# Patient Record
Sex: MALE | Race: BLACK OR AFRICAN AMERICAN | Employment: UNEMPLOYED | URBAN - METROPOLITAN AREA
[De-identification: names, ages, dates, MRNs, and addresses within clinical notes are randomized per-mention and may not be internally consistent; named-entity substitution may affect disease eponyms.]

---
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
## 2020-01-01 ENCOUNTER — HOSPITAL ENCOUNTER (EMERGENCY)
Age: 20
Discharge: HOME | End: 2020-01-01
Attending: EMERGENCY MEDICINE
Payer: MEDICAID

## 2020-01-01 VITALS
OXYGEN SATURATION: 97 % | WEIGHT: 302 LBS | HEIGHT: 68 IN | HEART RATE: 102 BPM | DIASTOLIC BLOOD PRESSURE: 107 MMHG | BODY MASS INDEX: 45.77 KG/M2 | SYSTOLIC BLOOD PRESSURE: 180 MMHG | RESPIRATION RATE: 20 BRPM | TEMPERATURE: 98.7 F

## 2020-01-01 DIAGNOSIS — R31.9 HEMATURIA, UNSPECIFIED TYPE: Primary | ICD-10-CM

## 2020-01-01 DIAGNOSIS — R73.9 HYPERGLYCEMIA: ICD-10-CM

## 2020-01-01 DIAGNOSIS — E11.9 TYPE 2 DIABETES MELLITUS WITHOUT COMPLICATION, WITHOUT LONG-TERM CURRENT USE OF INSULIN (HCC): ICD-10-CM

## 2020-01-01 DIAGNOSIS — I10 ESSENTIAL HYPERTENSION: ICD-10-CM

## 2020-01-01 LAB
APPEARANCE UR: ABNORMAL
BACTERIA URNS QL MICRO: NEGATIVE /HPF
BILIRUB UR QL: NEGATIVE
COLOR UR: YELLOW
EPITH CASTS URNS QL MICRO: ABNORMAL /LPF (ref 0–5)
GLUCOSE BLD STRIP.AUTO-MCNC: 281 MG/DL (ref 70–110)
GLUCOSE UR STRIP.AUTO-MCNC: >1000 MG/DL
HGB UR QL STRIP: ABNORMAL
KETONES UR QL STRIP.AUTO: 15 MG/DL
LEUKOCYTE ESTERASE UR QL STRIP.AUTO: NEGATIVE
MUCOUS THREADS URNS QL MICRO: ABNORMAL /LPF
NITRITE UR QL STRIP.AUTO: NEGATIVE
PH UR STRIP: 5 [PH] (ref 5–8)
PROT UR STRIP-MCNC: 100 MG/DL
RBC #/AREA URNS HPF: ABNORMAL /HPF (ref 0–5)
SP GR UR REFRACTOMETRY: >1.03 (ref 1–1.03)
UROBILINOGEN UR QL STRIP.AUTO: 1 EU/DL (ref 0.2–1)
WBC URNS QL MICRO: ABNORMAL /HPF (ref 0–4)

## 2020-01-01 PROCEDURE — 87491 CHLMYD TRACH DNA AMP PROBE: CPT

## 2020-01-01 PROCEDURE — 99283 EMERGENCY DEPT VISIT LOW MDM: CPT

## 2020-01-01 PROCEDURE — 81001 URINALYSIS AUTO W/SCOPE: CPT

## 2020-01-01 PROCEDURE — 87661 TRICHOMONAS VAGINALIS AMPLIF: CPT

## 2020-01-01 PROCEDURE — 87086 URINE CULTURE/COLONY COUNT: CPT

## 2020-01-01 PROCEDURE — 82962 GLUCOSE BLOOD TEST: CPT

## 2020-01-01 RX ORDER — PHENAZOPYRIDINE HYDROCHLORIDE 200 MG/1
200 TABLET, FILM COATED ORAL 3 TIMES DAILY
Qty: 6 TAB | Refills: 0 | Status: SHIPPED | OUTPATIENT
Start: 2020-01-01 | End: 2020-01-03

## 2020-01-01 RX ORDER — AMLODIPINE BESYLATE 5 MG/1
5 TABLET ORAL DAILY
Qty: 15 TAB | Refills: 0 | Status: SHIPPED | OUTPATIENT
Start: 2020-01-01 | End: 2021-10-12 | Stop reason: SDUPTHER

## 2020-01-01 RX ORDER — METFORMIN HYDROCHLORIDE 500 MG/1
500 TABLET ORAL 2 TIMES DAILY WITH MEALS
Qty: 30 TAB | Refills: 0 | Status: SHIPPED | OUTPATIENT
Start: 2020-01-01 | End: 2021-10-12 | Stop reason: SDUPTHER

## 2020-01-01 NOTE — ED PROVIDER NOTES
EMERGENCY DEPARTMENT HISTORY AND PHYSICAL EXAM    Date: 1/1/2020  Patient Name: Jovita Gutierres    History of Presenting Illness     Chief Complaint   Patient presents with    Blood in Urine         History Provided By:patient    Chief Complaint: hematuria   Duration: 2 days  Timing: acute  Location: n/a  Quality: burning  Severity: moderate  Modifying Factors: none  Associated Symptoms: dysuria       Additional History (Context): Jovita Gutierres is a 23 y.o. male with PMH DM, htn, morbid obesity, and asthma who presents with c/o hematuria and dysuria for the past 2 days. He has had kidney stones in the past but states his symptoms today are not similar to prior kidney stones. When asked about his history of diabetes and hypertension patient states he did not realize he was diabetic or had blood pressure issues. Patient is not currently taking any daily medications. No other complaints reported at this time. PCP: Kaitlin Colindres MD    Current Outpatient Medications   Medication Sig Dispense Refill    metFORMIN (GLUCOPHAGE) 500 mg tablet Take 1 Tab by mouth two (2) times daily (with meals). 30 Tab 0    amLODIPine (NORVASC) 5 mg tablet Take 1 Tab by mouth daily. 15 Tab 0    phenazopyridine (PYRIDIUM) 200 mg tablet Take 1 Tab by mouth three (3) times daily for 2 days. 6 Tab 0    mupirocin calcium (BACTROBAN) 2 % topical cream Apply  to affected area two (2) times a day.  15 g 0    clotrimazole (LOTRIMIN) 1 % topical cream Apply twice a day for 2-4 weeks 1 Tube 0       Past History     Past Medical History:  Past Medical History:   Diagnosis Date    Asthma     Diabetes ([address]     Hypertension     Morbid obesity ([facility] [address]        Past Surgical History:  Past Surgical History:   Procedure Laterality Date    HX ORTHOPAEDIC      left leg       Family History:  Family History   Problem Relation Age of Onset    Hypertension Other     Cancer Neg Hx     Diabetes Neg Hx     Stroke Neg Hx     Heart Disease Neg Hx Social History:  Social History     Tobacco Use    Smoking status: Never Smoker    Smokeless tobacco: Never Used   Substance Use Topics    Alcohol use: No    Drug use: No       Allergies:  No Known Allergies      Review of Systems   Review of Systems   Constitutional: Negative. Negative for chills and fever. HENT: Negative. Negative for congestion, ear pain and rhinorrhea. Eyes: Negative. Negative for pain and redness. Respiratory: Negative. Negative for cough, shortness of breath, wheezing and stridor. Cardiovascular: Negative. Negative for chest pain and leg swelling. Gastrointestinal: Negative. Negative for abdominal pain, constipation, diarrhea, nausea and vomiting. Genitourinary: Positive for dysuria and hematuria. Negative for frequency. Musculoskeletal: Negative. Negative for back pain and neck pain. Skin: Negative. Negative for rash and wound. Neurological: Negative. Negative for dizziness, seizures, syncope and headaches. All other systems reviewed and are negative. All Other Systems Negative  Physical Exam     Vitals:    01/01/20 1237 01/01/20 1318   BP: (!) 180/107    Pulse: 100 (!) 102   Resp: 20    Temp: (!) 102 °F (38.9 °C) 98.7 °F (37.1 °C)   SpO2: 97%    Weight: 137 kg (302 lb)    Height: 5' 8\" (1.727 m)      Physical Exam  Vitals signs and nursing note reviewed. Constitutional:       General: He is not in acute distress. Appearance: He is well-developed. He is obese. He is not diaphoretic. HENT:      Head: Normocephalic and atraumatic. Eyes:      General: No scleral icterus. Right eye: No discharge. Left eye: No discharge. Conjunctiva/sclera: Conjunctivae normal.   Neck:      Musculoskeletal: Normal range of motion and neck supple. Cardiovascular:      Rate and Rhythm: Normal rate and regular rhythm. Heart sounds: Normal heart sounds. No murmur. No friction rub. No gallop.     Pulmonary:      Effort: Pulmonary effort is normal. No respiratory distress. Breath sounds: Normal breath sounds. No stridor. No wheezing, rhonchi or rales. Abdominal:      General: Bowel sounds are normal. There is no distension. Palpations: Abdomen is soft. Tenderness: There is no tenderness. There is no right CVA tenderness, left CVA tenderness or guarding. Musculoskeletal: Normal range of motion. Skin:     General: Skin is warm and dry. Findings: No erythema or rash. Neurological:      Mental Status: He is alert and oriented to person, place, and time. Coordination: Coordination normal.      Comments: Gait is steady and patient exhibits no evidence of ataxia. Patient is able to ambulate without difficulty. No focal neurological deficit noted. No facial droop, slurred speech, or evidence of altered mentation noted on exam.     Psychiatric:         Behavior: Behavior normal.         Thought Content:  Thought content normal.                Diagnostic Study Results     Labs -     Recent Results (from the past 12 hour(s))   URINALYSIS W/ RFLX MICROSCOPIC    Collection Time: 01/01/20 12:45 PM   Result Value Ref Range    Color YELLOW      Appearance CLOUDY      Specific gravity >1.030 (H) 1.005 - 1.030    pH (UA) 5.0 5.0 - 8.0      Protein 100 (A) NEG mg/dL    Glucose >1,000 (A) NEG mg/dL    Ketone 15 (A) NEG mg/dL    Bilirubin NEGATIVE  NEG      Blood TRACE (A) NEG      Urobilinogen 1.0 0.2 - 1.0 EU/dL    Nitrites NEGATIVE  NEG      Leukocyte Esterase NEGATIVE  NEG     URINE MICROSCOPIC ONLY    Collection Time: 01/01/20 12:45 PM   Result Value Ref Range    WBC 4 to 10 0 - 4 /hpf    RBC 0 to 3 0 - 5 /hpf    Epithelial cells FEW 0 - 5 /lpf    Bacteria NEGATIVE  NEG /hpf    Mucus FEW (A) NEG /lpf   GLUCOSE, POC    Collection Time: 01/01/20  1:20 PM   Result Value Ref Range    Glucose (POC) 281 (H) 70 - 110 mg/dL       Radiologic Studies -   No orders to display     CT Results  (Last 48 hours)    None        CXR Results  (Last 48 hours)    None            Medical Decision Making   I am the first provider for this patient. I reviewed the vital signs, available nursing notes, past medical history, past surgical history, family history and social history. Vital Signs-Reviewed the patient's vital signs. Records Reviewed: Nicolette Machuca PA-C     Procedures:  Procedures    Provider Notes (Medical Decision Making): Impression:  Hematuria,htn, morbid obesity, dysuria, DM    UA shows trace hematuria, not convincing of UTI, sent for culture. Trichomonas/GC/chlamydia culture sent out. POC glucose 281. Patient has no flank pain or abdominal pain. He is afebrile. No indication for further work-up at this time. We will plan to discharge patient with Pyridium, and plan to start him on metformin/Norvasc with close PCP and urology follow-up recommended. Patient agrees with this plan. Nicolette Machuca PA-C     MED RECONCILIATION:  No current facility-administered medications for this encounter. Current Outpatient Medications   Medication Sig    metFORMIN (GLUCOPHAGE) 500 mg tablet Take 1 Tab by mouth two (2) times daily (with meals).  amLODIPine (NORVASC) 5 mg tablet Take 1 Tab by mouth daily.  phenazopyridine (PYRIDIUM) 200 mg tablet Take 1 Tab by mouth three (3) times daily for 2 days.  mupirocin calcium (BACTROBAN) 2 % topical cream Apply  to affected area two (2) times a day.  clotrimazole (LOTRIMIN) 1 % topical cream Apply twice a day for 2-4 weeks       Disposition:  D/c    DISCHARGE NOTE:   Patient is stable for discharge at this time. I have discussed all the findings from today's work up with the patient, including lab results and imaging. I have answered all questions. Rx for norvasc, pyridium, and metformin given. Rest and close follow-up with the PCP/urologist recommended this week. Return to the ED immediately for any new or worsening symptoms.   Nicolette Machuca PA-C     Follow-up Information     Follow up With Specialties Details Why Contact Info    [facility]  Schedule an appointment as soon as possible for a visit in 1 week  [address] [facility]    Ji Cabrera MD Pediatrics Schedule an appointment as soon as possible for a visit in 1 week  [address]  [address]  [phone] [address] [facility] EMERGENCY DEPT Emergency Medicine  As needed, If symptoms worsen [address] [facility]  [phone]          Current Discharge Medication List      START taking these medications    Details   metFORMIN (GLUCOPHAGE) 500 mg tablet Take 1 Tab by mouth two (2) times daily (with meals). Qty: 30 Tab, Refills: 0      amLODIPine (NORVASC) 5 mg tablet Take 1 Tab by mouth daily. Qty: 15 Tab, Refills: 0      phenazopyridine (PYRIDIUM) 200 mg tablet Take 1 Tab by mouth three (3) times daily for 2 days. Qty: 6 Tab, Refills: 0                 Diagnosis     Clinical Impression:   1. Hematuria, unspecified type    2. Essential hypertension    3. Type 2 diabetes mellitus without complication, without long-term current use of insulin ([address]    4.  Hyperglycemia

## 2020-01-01 NOTE — DISCHARGE INSTRUCTIONS
[employer] Activation    Thank you for requesting access to [employer]. Please follow the instructions below to securely access and download your online medical record. [employer] allows you to send messages to your doctor, view your test results, renew your prescriptions, schedule appointments, and more. How Do I Sign Up? 1. In your internet browser, go to [email or web address]  2. Click on the First Time User? Click Here link in the Sign In box. You will be redirect to the New Member Sign Up page. 3. Enter your [employer] Access Code exactly as it appears below. You will not need to use this code after youve completed the sign-up process. If you do not sign up before the expiration date, you must request a new code. [employer] Access Code: [email or web address]  Expires: 2020  4:52 PM (This is the date your [employer] access code will )    4. Enter the last four digits of your Social Security Number (xxxx) and Date of Birth (mm/dd/yyyy) as indicated and click Submit. You will be taken to the next sign-up page. 5. Create a [employer] ID. This will be your [employer] login ID and cannot be changed, so think of one that is secure and easy to remember. 6. Create a [employer] password. You can change your password at any time. 7. Enter your Password Reset Question and Answer. This can be used at a later time if you forget your password. 8. Enter your e-mail address. You will receive e-mail notification when new information is available in [address] 9. Click Sign Up. You can now view and download portions of your medical record. 10. Click the Download Summary menu link to download a portable copy of your medical information. Additional Information    If you have questions, please visit the Frequently Asked Questions section of the [employer] website at [email or web address] Remember, [employer] is NOT to be used for urgent needs. For medical emergencies, dial 911.

## 2020-01-01 NOTE — ED TRIAGE NOTES
Pt c/o blood in urine 2 days ago. Today c/o pain with urination. Has hx of kidney stones \"a long time ago\".  Does not appear to be in any distress

## 2020-01-03 LAB
BACTERIA SPEC CULT: NORMAL
SERVICE CMNT-IMP: NORMAL

## 2020-01-06 LAB
C TRACH RRNA SPEC QL NAA+PROBE: NORMAL
N GONORRHOEA RRNA SPEC QL NAA+PROBE: NORMAL
SPECIMEN SOURCE: NORMAL
T VAGINALIS RRNA VAG QL NAA+PROBE: NEGATIVE

## 2020-01-16 ENCOUNTER — OFFICE VISIT (OUTPATIENT)
Dept: SURGERY | Age: 20
End: 2020-01-16

## 2020-01-16 VITALS
DIASTOLIC BLOOD PRESSURE: 84 MMHG | RESPIRATION RATE: 18 BRPM | OXYGEN SATURATION: 97 % | HEART RATE: 94 BPM | BODY MASS INDEX: 47.74 KG/M2 | HEIGHT: 68 IN | TEMPERATURE: 98.4 F | WEIGHT: 315 LBS | SYSTOLIC BLOOD PRESSURE: 146 MMHG

## 2020-01-16 DIAGNOSIS — E66.01 MORBID OBESITY (HCC): Primary | ICD-10-CM

## 2020-01-16 NOTE — PROGRESS NOTES
Chief Complaint   Patient presents with    Morbid Obesity     consult     Pt ID confirmed    Weight Loss Metrics 1/16/2020 1/16/2020 1/1/2020 12/7/2019 9/5/2019 2/14/2019 12/1/2018   Pre op / Initial Wt 395.3 - - - - - -   Today's Wt - 395 lb 4.8 oz 302 lb 318 lb 320 lb 320 lb 350 lb   BMI - 60.11 kg/m2 45.92 kg/m2 51.33 kg/m2 48.66 kg/m2 47.26 kg/m2 -   Ideal Body Wt 151 - - - - - -   Excess Body Wt 244.3 - - - - - -   Wt loss to date 0 - - - - - -   % Wt Loss 0 - - - - - -   80% .44 - - - - - -       Body mass index is 60.11 kg/m².

## 2020-01-16 NOTE — LETTER
1/16/20 Patient: Flor Dowell YOB: 2000 Date of Visit: 1/16/2020 Ruben Brannon MD 
[address] [facility] [address] [facility] [address] VIA Facsimile: [phone] Dear Ruben Brannon MD, Thank you for referring Mr. Flor Dowell to [place] [address] for evaluation. My notes for this consultation are attached. If you have questions, please do not hesitate to call me. I look forward to following your patient along with you.  
 
 
Sincerely, 
 
Lucía Gastelum MD

## 2020-01-16 NOTE — PROGRESS NOTES
Initial Consultation for Bariatric Surgery Template     Flor Dowell is a 23 y.o. male who comes into the office today for initial consultation for the surgical options for the treatment of morbid obesity. The patient initially identified obesity at the age of 11 and at age 25 weighed 370 lbs. He has gained 25 lbs in the last year. He cannot explain why he wants weight loss surgery, any of his eating behaviors or why he thinks WLS would benefit him. .    Past Medical History:   Diagnosis Date    Asthma     [facility] [address]     Hypertension     Morbid obesity ([facility] [address]        Past Surgical History:   Procedure Laterality Date    HX ORTHOPAEDIC      left leg    HX TONSILLECTOMY      HX TYMPANOSTOMY      when child       Current Outpatient Medications on File Prior to Visit   Medication Sig Dispense Refill    metFORMIN (GLUCOPHAGE) 500 mg tablet Take 1 Tab by mouth two (2) times daily (with meals). 30 Tab 0    amLODIPine (NORVASC) 5 mg tablet Take 1 Tab by mouth daily. 15 Tab 0    clotrimazole (LOTRIMIN) 1 % topical cream Apply twice a day for 2-4 weeks 1 Tube 0     No current facility-administered medications on file prior to visit.         No Known Allergies    Social History     Tobacco Use    Smoking status: Never Smoker    Smokeless tobacco: Never Used   Substance Use Topics    Alcohol use: No    Drug use: No       Family History   Problem Relation Age of Onset    Hypertension Other     No Known Problems Mother     No Known Problems Father     Cancer Neg Hx     Diabetes Neg Hx     Stroke Neg Hx     Heart Disease Neg Hx        Family Status   Relation Name Status    Other  (Not Specified)    Mother  Alive   Amanda Arzate Father  Alive    Neg Hx  (Not Specified)       Review of Systems:  Positive in BOLD    CONST: Fever, weight loss, fatigue or chills  GI: Nausea, vomiting, abdominal pain, change in bowel habits, hematochezia, melena, and GERD   INTEG: Dermatitis, abnormal moles  HEENT: Recent changes in vision, vertigo, epistaxis, dysphagia and hoarseness  CV: Chest pain, palpitations, HTN, edema and varicosities  RESP: Cough, shortness of breath, wheezing, hemoptysis, snoring and reactive airway disease  : Hematuria, dysuria, frequency, urgency, nocturia and stress urinary incontinence   MS: Weakness, joint pain and arthritis  ENDO: Diabetes, thyroid disease, polyuria, polydipsia, polyphagia, poor wound healing, heat intolerance, cold intolerance  LYMPH/HEME: Anemia, bruising and history of blood transfusions  NEURO: Dizziness, headache, fainting, seizures and stroke  PSYCH: Anxiety and depression      Physical Exam    Visit Vitals  /84   Pulse 94   Temp 98.4 °F (36.9 °C) (Oral)   Resp 18   Ht 5' 8\" (1.727 m)   Wt (!) 179.3 kg (395 lb 4.8 oz)   SpO2 97%   BMI 60.11 kg/m²       Pre op weight: 395.3  EBW: 244. 3  Wt loss to date: 0       General: 23 y.o.) male in no acute distress. Morbidly obese in hips and abdomen  HEENT: Normocephalic, atraumatic, Pupils equal and reactive, nasopharynx clear, oropharynx clear and moist without lesions  NECK: Supple, no lymphadenopathy, thyromegaly, carotid bruits or jugular venous distension. trachea midline  Psych: Alert and oriented to person, place, and time. Extremely evasive, did not answer a single question directly including simple yes and no questions. Impression:    Marcelino Mason is a 23 y.o. male who is suffering from morbid obesity with a BMI of 60  and comorbidities including adult onset diabetes mellitus, hypertension and reactive airway disease  who would benefit from bariatric surgery. Unfortunately, he exhibits very little insight into his illness and his behaviors and is unwilling to cooperate with a WLS evaluation. I have told him that I do not think he is ready to have WLS and I recommended he take some time, evaluate his priorities and retrun when he is willing to commit to [facility] and its need for change.

## 2021-10-12 ENCOUNTER — HOSPITAL ENCOUNTER (EMERGENCY)
Age: 21
Discharge: HOME | End: 2021-10-12
Attending: STUDENT IN AN ORGANIZED HEALTH CARE EDUCATION/TRAINING PROGRAM
Payer: MEDICAID

## 2021-10-12 ENCOUNTER — APPOINTMENT (OUTPATIENT)
Dept: CT IMAGING | Age: 21
End: 2021-10-12
Attending: NURSE PRACTITIONER
Payer: MEDICAID

## 2021-10-12 VITALS
HEIGHT: 67 IN | HEART RATE: 89 BPM | SYSTOLIC BLOOD PRESSURE: 169 MMHG | OXYGEN SATURATION: 98 % | TEMPERATURE: 98.6 F | RESPIRATION RATE: 17 BRPM | BODY MASS INDEX: 48.65 KG/M2 | DIASTOLIC BLOOD PRESSURE: 108 MMHG | WEIGHT: 310 LBS

## 2021-10-12 DIAGNOSIS — Z11.3 SCREEN FOR STD (SEXUALLY TRANSMITTED DISEASE): ICD-10-CM

## 2021-10-12 DIAGNOSIS — N34.2 URETHRITIS: Primary | ICD-10-CM

## 2021-10-12 LAB
APPEARANCE UR: CLEAR
BACTERIA URNS QL MICRO: ABNORMAL /HPF
BASOPHILS # BLD: 0 K/UL (ref 0–0.1)
BASOPHILS NFR BLD: 1 % (ref 0–2)
BILIRUB UR QL: NEGATIVE
COLOR UR: YELLOW
DIFFERENTIAL METHOD BLD: ABNORMAL
EOSINOPHIL # BLD: 0.4 K/UL (ref 0–0.4)
EOSINOPHIL NFR BLD: 4 % (ref 0–5)
EPITH CASTS URNS QL MICRO: ABNORMAL /LPF (ref 0–5)
ERYTHROCYTE [DISTWIDTH] IN BLOOD BY AUTOMATED COUNT: 12.7 % (ref 11.6–14.5)
GLUCOSE UR STRIP.AUTO-MCNC: >1000 MG/DL
HCT VFR BLD AUTO: 44.5 % (ref 36–48)
HGB BLD-MCNC: 14.8 G/DL (ref 13–16)
HGB UR QL STRIP: ABNORMAL
KETONES UR QL STRIP.AUTO: 40 MG/DL
LEUKOCYTE ESTERASE UR QL STRIP.AUTO: ABNORMAL
LYMPHOCYTES # BLD: 2.6 K/UL (ref 0.9–3.6)
LYMPHOCYTES NFR BLD: 29 % (ref 21–52)
MCH RBC QN AUTO: 27.3 PG (ref 24–34)
MCHC RBC AUTO-ENTMCNC: 33.3 G/DL (ref 31–37)
MCV RBC AUTO: 82 FL (ref 78–100)
MONOCYTES # BLD: 0.6 K/UL (ref 0.05–1.2)
MONOCYTES NFR BLD: 7 % (ref 3–10)
NEUTS SEG # BLD: 5.3 K/UL (ref 1.8–8)
NEUTS SEG NFR BLD: 59 % (ref 40–73)
NITRITE UR QL STRIP.AUTO: NEGATIVE
PH UR STRIP: 6 [PH] (ref 5–8)
PLATELET # BLD AUTO: 440 K/UL (ref 135–420)
PMV BLD AUTO: 9.2 FL (ref 9.2–11.8)
PROT UR STRIP-MCNC: 100 MG/DL
RBC # BLD AUTO: 5.43 M/UL (ref 4.35–5.65)
RBC #/AREA URNS HPF: ABNORMAL /HPF (ref 0–5)
SP GR UR REFRACTOMETRY: >1.03 (ref 1–1.03)
UROBILINOGEN UR QL STRIP.AUTO: 1 EU/DL (ref 0.2–1)
WBC # BLD AUTO: 8.9 K/UL (ref 4.6–13.2)
WBC URNS QL MICRO: ABNORMAL /HPF (ref 0–5)

## 2021-10-12 PROCEDURE — 87147 CULTURE TYPE IMMUNOLOGIC: CPT

## 2021-10-12 PROCEDURE — 85025 COMPLETE CBC W/AUTO DIFF WBC: CPT

## 2021-10-12 PROCEDURE — 81001 URINALYSIS AUTO W/SCOPE: CPT

## 2021-10-12 PROCEDURE — 99283 EMERGENCY DEPT VISIT LOW MDM: CPT

## 2021-10-12 PROCEDURE — 74011000250 HC RX REV CODE- 250: Performed by: NURSE PRACTITIONER

## 2021-10-12 PROCEDURE — 74011250636 HC RX REV CODE- 250/636: Performed by: NURSE PRACTITIONER

## 2021-10-12 PROCEDURE — 96372 THER/PROPH/DIAG INJ SC/IM: CPT

## 2021-10-12 PROCEDURE — 87086 URINE CULTURE/COLONY COUNT: CPT

## 2021-10-12 PROCEDURE — 87491 CHLMYD TRACH DNA AMP PROBE: CPT

## 2021-10-12 PROCEDURE — 74011250637 HC RX REV CODE- 250/637: Performed by: NURSE PRACTITIONER

## 2021-10-12 PROCEDURE — 87661 TRICHOMONAS VAGINALIS AMPLIF: CPT

## 2021-10-12 PROCEDURE — 74176 CT ABD & PELVIS W/O CONTRAST: CPT

## 2021-10-12 RX ORDER — CEPHALEXIN 500 MG/1
500 CAPSULE ORAL 2 TIMES DAILY
Qty: 20 CAPSULE | Refills: 0 | Status: SHIPPED | OUTPATIENT
Start: 2021-10-12 | End: 2021-10-22

## 2021-10-12 RX ORDER — METRONIDAZOLE 500 MG/1
2000 TABLET ORAL
Status: COMPLETED | OUTPATIENT
Start: 2021-10-12 | End: 2021-10-12

## 2021-10-12 RX ORDER — METFORMIN HYDROCHLORIDE 500 MG/1
500 TABLET ORAL 2 TIMES DAILY WITH MEALS
Qty: 60 TABLET | Refills: 0 | Status: SHIPPED | OUTPATIENT
Start: 2021-10-12 | End: 2021-11-11

## 2021-10-12 RX ORDER — AZITHROMYCIN 500 MG/1
1000 TABLET, FILM COATED ORAL
Qty: 2 TABLET | Refills: 0 | Status: SHIPPED | OUTPATIENT
Start: 2021-10-12 | End: 2021-10-12

## 2021-10-12 RX ORDER — AMLODIPINE BESYLATE 5 MG/1
5 TABLET ORAL DAILY
Qty: 30 TABLET | Refills: 0 | Status: SHIPPED | OUTPATIENT
Start: 2021-10-12 | End: 2021-11-11

## 2021-10-12 RX ADMIN — METRONIDAZOLE 2000 MG: 500 TABLET ORAL at 12:27

## 2021-10-12 RX ADMIN — LIDOCAINE HYDROCHLORIDE 1 G: 10 INJECTION, SOLUTION EPIDURAL; INFILTRATION; INTRACAUDAL; PERINEURAL at 12:28

## 2021-10-12 NOTE — ED PROVIDER NOTES
EMERGENCY DEPARTMENT HISTORY AND PHYSICAL EXAM    12:14 PM      Date: 10/12/2021  Patient Name: Zak Moreno    History of Presenting Illness     Chief Complaint   Patient presents with    Other     pain on urination         History Provided By: Patient    Additional History (Context): Zak Moreno is a 24 y.o. male with past medical history significant for morbid obesity, hypertension, diabetes, and asthma who presents with complaints of dysuria that started yesterday and concern for possible kidney stone as he has had this in the past.  Patient denies fever, chills, nausea, vomiting, diarrhea, constipation, abdominal pain, flank pain, or back pain. He denies any exposure to STD and denies any penile discharge, lesions or rash, testicular pain or swelling. He is open to being tested and treated for STDs. Does report that he has been out of his hypertension and diabetes medications for about a month. PCP: Hines Osler, MD    Current Outpatient Medications   Medication Sig Dispense Refill    azithromycin (ZITHROMAX) 500 mg tab Take 2 Tablets by mouth now for 1 dose. 2 Tablet 0    cephALEXin (Keflex) 500 mg capsule Take 1 Capsule by mouth two (2) times a day for 10 days. 20 Capsule 0    amLODIPine (Norvasc) 5 mg tablet Take 1 Tablet by mouth daily for 30 days. 30 Tablet 0    metFORMIN (GLUCOPHAGE) 500 mg tablet Take 1 Tablet by mouth two (2) times daily (with meals) for 30 days.  60 Tablet 0    clotrimazole (LOTRIMIN) 1 % topical cream Apply twice a day for 2-4 weeks 1 Tube 0       Past History     Past Medical History:  Past Medical History:   Diagnosis Date    Asthma     Diabetes ([address]     Hypertension     Morbid obesity ([address]        Past Surgical History:  Past Surgical History:   Procedure Laterality Date    HX ORTHOPAEDIC      left leg    HX TONSILLECTOMY      HX TYMPANOSTOMY      when child       Family History:  Family History   Problem Relation Age of Onset    Hypertension Other     No Known Problems Mother     No Known Problems Father     Cancer Neg Hx     Diabetes Neg Hx     Stroke Neg Hx     Heart Disease Neg Hx        Social History:  Social History     Tobacco Use    Smoking status: Never Smoker    Smokeless tobacco: Never Used   Substance Use Topics    Alcohol use: No    Drug use: No       Allergies:  No Known Allergies      Review of Systems       Review of Systems   Constitutional: Negative. Negative for chills and fever. HENT: Negative. Negative for congestion, ear pain and rhinorrhea. Eyes: Negative. Negative for pain and redness. Respiratory: Negative. Negative for cough and shortness of breath. Cardiovascular: Negative. Negative for chest pain, palpitations and leg swelling. Gastrointestinal: Positive for abdominal pain (Suprapubic). Negative for constipation, diarrhea, nausea and vomiting. Genitourinary: Positive for dysuria. Negative for frequency, hematuria and urgency. Musculoskeletal: Negative. Negative for back pain, gait problem, joint swelling and neck pain. Skin: Negative. Negative for rash and wound. Neurological: Negative. Negative for dizziness, seizures, speech difficulty, weakness, light-headedness and headaches. Hematological: Negative for adenopathy. Does not bruise/bleed easily. All other systems reviewed and are negative. Physical Exam     Visit Vitals  BP (!) 169/108   Pulse 89   Temp 98.6 °F (37 °C)   Resp 17   Ht 5' 7\" (1.702 m)   Wt 140.6 kg (310 lb)   SpO2 98%   BMI 48.55 kg/m²         Physical Exam  Vitals and nursing note reviewed. Constitutional:       General: He is not in acute distress. Appearance: Normal appearance. He is obese. He is not ill-appearing, toxic-appearing or diaphoretic. HENT:      Head: Normocephalic and atraumatic. Nose: Nose normal. No congestion or rhinorrhea. Mouth/Throat:      Mouth: Mucous membranes are moist.      Pharynx: Oropharynx is clear.  No oropharyngeal exudate or posterior oropharyngeal erythema. Eyes:      General: Vision grossly intact. Gaze aligned appropriately. No scleral icterus. Right eye: No discharge. Left eye: No discharge. Conjunctiva/sclera: Conjunctivae normal.   Cardiovascular:      Rate and Rhythm: Normal rate and regular rhythm. Pulses: Normal pulses. Heart sounds: Normal heart sounds. No murmur heard. No gallop. Pulmonary:      Effort: Pulmonary effort is normal. No respiratory distress. Breath sounds: Normal breath sounds. No stridor. No wheezing, rhonchi or rales. Chest:      Chest wall: No tenderness. Abdominal:      General: Abdomen is flat. Palpations: Abdomen is soft. Tenderness: There is no abdominal tenderness. There is no right CVA tenderness, left CVA tenderness, guarding or rebound. Musculoskeletal:         General: Normal range of motion. Cervical back: Full passive range of motion without pain, normal range of motion and neck supple. No rigidity or tenderness. Lymphadenopathy:      Cervical: No cervical adenopathy. Skin:     General: Skin is warm and dry. Capillary Refill: Capillary refill takes less than 2 seconds. Findings: No rash. Neurological:      General: No focal deficit present. Mental Status: He is alert and oriented to person, place, and time.    Psychiatric:         Mood and Affect: Mood normal.           Diagnostic Study Results     Labs -  Recent Results (from the past 12 hour(s))   URINALYSIS W/ RFLX MICROSCOPIC    Collection Time: 10/12/21 11:29 AM   Result Value Ref Range    Color YELLOW      Appearance CLEAR      Specific gravity >1.030 (H) 1.005 - 1.030    pH (UA) 6.0 5.0 - 8.0      Protein 100 (A) NEG mg/dL    Glucose >1,000 (A) NEG mg/dL    Ketone 40 (A) NEG mg/dL    Bilirubin Negative NEG      Blood SMALL (A) NEG      Urobilinogen 1.0 0.2 - 1.0 EU/dL    Nitrites Negative NEG      Leukocyte Esterase TRACE (A) NEG     URINE MICROSCOPIC ONLY Collection Time: 10/12/21 11:29 AM   Result Value Ref Range    WBC 5 to 10 0 - 5 /hpf    RBC 1 to 4 0 - 5 /hpf    Epithelial cells 1+ 0 - 5 /lpf    Bacteria 1+ (A) NEG /hpf   CBC WITH AUTOMATED DIFF    Collection Time: 10/12/21 12:37 PM   Result Value Ref Range    WBC 8.9 4.6 - 13.2 K/uL    RBC 5.43 4.35 - 5.65 M/uL    HGB 14.8 13.0 - 16.0 g/dL    HCT 44.5 36.0 - 48.0 %    MCV 82.0 78.0 - 100.0 FL    MCH 27.3 24.0 - 34.0 PG    MCHC 33.3 31.0 - 37.0 g/dL    RDW 12.7 11.6 - 14.5 %    PLATELET 220 (H) 649 - 420 K/uL    MPV 9.2 9.2 - 11.8 FL    NEUTROPHILS 59 40 - 73 %    LYMPHOCYTES 29 21 - 52 %    MONOCYTES 7 3 - 10 %    EOSINOPHILS 4 0 - 5 %    BASOPHILS 1 0 - 2 %    ABS. NEUTROPHILS 5.3 1.8 - 8.0 K/UL    ABS. LYMPHOCYTES 2.6 0.9 - 3.6 K/UL    ABS. MONOCYTES 0.6 0.05 - 1.2 K/UL    ABS. EOSINOPHILS 0.4 0.0 - 0.4 K/UL    ABS. BASOPHILS 0.0 0.0 - 0.1 K/UL    DF AUTOMATED         Radiologic Studies -   CT ABD PELV WO CONT   Final Result      No nephrolithiasis or hydronephrosis. Borderline hepatic steatosis. Medical Decision Making   I am the first provider for this patient. I reviewed available nursing notes, past medical history, past surgical history, family history and social history. Vital Signs-Reviewed the patient's vital signs. Records Reviewed: Nursing Notes and Old Medical Records (Time of Review: 12:14 PM)    Pulse Oximetry Analysis - 98% on RA- normal     ED Course: Progress Notes, Reevaluation, and Consults:  12:14 PM  Initial assessment performed. The patients presenting problems have been discussed, and they/their family are in agreement with the care plan formulated and outlined with them. I have encouraged them to ask questions as they arise throughout their visit. 2:13 PM patient reassessed and results discussed. CBC shows no leukocytosis, anemia, shift, or bands. Urinalysis does show significant blood, RBCs, bacteria, and leukocyte esterase.   He also has protein, glucose, and ketones which is likely due to his uncontrolled diabetes and high blood pressure. BMP did not result as blood was hemolyzed and patient does not want to wait for repeat labs. Discussed not knowing his kidney function or glucose and he verbalized understanding will follow up with PCP. CT of the abdomen and pelvis shows no nephrolithiasis or hydronephrosis. Borderline hepatic steatosis. I discussed the available findings with patient and need for antibiotics. He has been treated empirically for STDs we will also refill his Norvasc and Metformin as well as cephalexin for UTI. Needs close follow-up with PCP. ER return precautions discussed. Provider Notes (Medical Decision Making):     Patient is a 68-year-old male who presents to the ER with dysuria and history of kidney stone. He denies any hematuria, fever, vomiting, abdominal or flank pain. He is not concerned for STDs but would like to be tested and treated. Offered testicular and genital exam but patient has declined need for this. He has no abdominal pain, flank pain, or back pain. Aside from elevated blood pressure, vitals are stable and he is afebrile. Will obtain appropriate studies to evaluate patient's complaints and treat symptomatically. Will disposition after reassessment assuming no clinical change or worsening and appropriate response to symptomatic treatment. Diagnosis     Clinical Impression:   1. Urethritis    2. Screen for STD (sexually transmitted disease)        Disposition: Home in stable condition    DISCHARGE NOTE:     Patient has been reexamined. Patient has no new complaints, changes, or physical findings. Care plan outlined and precautions discussed. Results of labs and ultrasound were reviewed with the patient. All medications were reviewed with the patient; will discharge home with refill of Norvasc and Metformin, and azithromycin for chlamydia empirically, cephalexin for UTI.  All of patient's questions and concerns were addressed. Patient was instructed and agrees to follow up with PCP, as well as to return to the ED upon further deterioration. Patient is ready to go home. Follow-up Information     Follow up With Specialties Details Why Contact Sudeep Castillo MD Pediatric Medicine Schedule an appointment as soon as possible for a visit  Follow-up from the Emergency Department [address] [facility]  [address] [facility] [address] [address]  [phone]      [facility] EMERGENCY DEPT Emergency Medicine  As needed, If symptoms worsen [address] [facility] [address]  [phone]           Discharge Medication List as of 10/12/2021  2:06 PM      START taking these medications    Details   azithromycin (ZITHROMAX) 500 mg tab Take 2 Tablets by mouth now for 1 dose., Normal, Disp-2 Tablet, R-0      cephALEXin (Keflex) 500 mg capsule Take 1 Capsule by mouth two (2) times a day for 10 days. , Normal, Disp-20 Capsule, R-0         CONTINUE these medications which have NOT CHANGED    Details   metFORMIN (GLUCOPHAGE) 500 mg tablet Take 1 Tab by mouth two (2) times daily (with meals). , Print, Disp-30 Tab, R-0      amLODIPine (NORVASC) 5 mg tablet Take 1 Tab by mouth daily. , Print, Disp-15 Tab, R-0      clotrimazole (LOTRIMIN) 1 % topical cream Apply twice a day for 2-4 weeks, Print, Disp-1 Tube, R-0               Dictation disclaimer:  Please note that this dictation was completed with [employer], the computer voice recognition software. Quite often unanticipated grammatical, syntax, homophones, and other interpretive errors are inadvertently transcribed by the computer software. Please disregard these errors. Please excuse any errors that have escaped final proofreading.

## 2021-10-12 NOTE — DISCHARGE INSTRUCTIONS
You were treated for exposure to a possible STD. No sexual activity for the next 2 weeks. Any sexual partner(s) should be checked for STD's as well. You should follow-up with the [facility] for any further testing for STDs, including HIV.     Address: [address]  Telephone: [phone]

## 2021-10-13 LAB
BACTERIA SPEC CULT: ABNORMAL
C TRACH RRNA SPEC QL NAA+PROBE: NEGATIVE
CC UR VC: ABNORMAL
N GONORRHOEA RRNA SPEC QL NAA+PROBE: NEGATIVE
SERVICE CMNT-IMP: ABNORMAL
SPECIMEN SOURCE: NORMAL

## 2021-10-15 LAB
SPECIMEN SOURCE: NORMAL
T VAGINALIS RRNA VAG QL NAA+PROBE: NEGATIVE

## 2022-03-19 PROBLEM — E66.01 MORBID OBESITY (HCC): Status: ACTIVE | Noted: 2020-01-16

## 2023-07-04 ENCOUNTER — HOSPITAL ENCOUNTER (EMERGENCY)
Facility: HOSPITAL | Age: 23
Discharge: HOME | End: 2023-07-04
Attending: EMERGENCY MEDICINE
Payer: MEDICAID

## 2023-07-04 VITALS
SYSTOLIC BLOOD PRESSURE: 139 MMHG | BODY MASS INDEX: 44.73 KG/M2 | OXYGEN SATURATION: 99 % | DIASTOLIC BLOOD PRESSURE: 87 MMHG | HEART RATE: 117 BPM | RESPIRATION RATE: 16 BRPM | TEMPERATURE: 98.4 F | WEIGHT: 285 LBS | HEIGHT: 67 IN

## 2023-07-04 DIAGNOSIS — L02.419 CELLULITIS AND ABSCESS OF LEG: Primary | ICD-10-CM

## 2023-07-04 DIAGNOSIS — L03.119 CELLULITIS AND ABSCESS OF LEG: Primary | ICD-10-CM

## 2023-07-04 PROCEDURE — 2500000003 HC RX 250 WO HCPCS: Performed by: EMERGENCY MEDICINE

## 2023-07-04 PROCEDURE — 99283 EMERGENCY DEPT VISIT LOW MDM: CPT

## 2023-07-04 PROCEDURE — 6370000000 HC RX 637 (ALT 250 FOR IP): Performed by: EMERGENCY MEDICINE

## 2023-07-04 RX ORDER — LIDOCAINE HYDROCHLORIDE AND EPINEPHRINE 10; 10 MG/ML; UG/ML
10 INJECTION, SOLUTION INFILTRATION; PERINEURAL
Status: COMPLETED | OUTPATIENT
Start: 2023-07-04 | End: 2023-07-04

## 2023-07-04 RX ORDER — SULFAMETHOXAZOLE AND TRIMETHOPRIM 800; 160 MG/1; MG/1
1 TABLET ORAL 2 TIMES DAILY
Qty: 14 TABLET | Refills: 0 | Status: SHIPPED | OUTPATIENT
Start: 2023-07-04 | End: 2023-07-11

## 2023-07-04 RX ORDER — ACETAMINOPHEN 500 MG
1000 TABLET ORAL
Status: COMPLETED | OUTPATIENT
Start: 2023-07-04 | End: 2023-07-04

## 2023-07-04 RX ORDER — CEPHALEXIN 500 MG/1
500 CAPSULE ORAL 4 TIMES DAILY
Qty: 28 CAPSULE | Refills: 0 | Status: SHIPPED | OUTPATIENT
Start: 2023-07-04 | End: 2023-07-11

## 2023-07-04 RX ADMIN — ACETAMINOPHEN 1000 MG: 500 TABLET ORAL at 06:46

## 2023-07-04 RX ADMIN — LIDOCAINE HYDROCHLORIDE AND EPINEPHRINE 10 ML: 10; 10 INJECTION, SOLUTION INFILTRATION; PERINEURAL at 06:50

## 2023-07-04 ASSESSMENT — PAIN SCALES - GENERAL
PAINLEVEL_OUTOF10: 8

## 2023-07-04 ASSESSMENT — PAIN DESCRIPTION - LOCATION: LOCATION: BUTTOCKS

## 2023-07-04 ASSESSMENT — PAIN DESCRIPTION - DESCRIPTORS: DESCRIPTORS: ACHING

## 2023-07-04 NOTE — DISCHARGE INSTRUCTIONS
Apply warm compress to the area to promote further drainage of pus clean daily with soap and water followed by drying. Can cover to prevent staining of close.

## 2023-07-04 NOTE — ED TRIAGE NOTES
Pt aox4, ambulatory. Comes in the ED for an abscess under the left butt cheek. Pt reports noticing it yesterday. Pt stated it is draining. Previous abscess in a different site.

## 2023-07-04 NOTE — ED PROVIDER NOTES
EMERGENCY DEPARTMENT HISTORY AND PHYSICAL EXAM      Date: 7/4/2023  Patient Name: Luz Trejo      History of Presenting Illness     Chief Complaint   Patient presents with    Abscess     Under the left butt cheek       Location/Duration/Severity/Modifying factors   Chief Complaint   Patient presents with    Abscess     Under the left butt cheek       HPI:  Luz Trejo is a 21 y.o. male with PMH significant for diabetes mellitus, obesity, prior skin abscesses presents with painful mass below left cheek. Had similar mass in the past but not to this area. Denies any recent trauma or insect bite to the area that he can recall. Denies associated fevers or chills, nausea or vomiting, bodyaches or fatigue. Treatments attempted at home include ibuprofen this morning.     PCP: Macy Rowland MD    Current Facility-Administered Medications   Medication Dose Route Frequency Provider Last Rate Last Admin    lidocaine-EPINEPHrine 1 %-1:[id number] injection 10 mL  10 mL IntraDERmal NOW Jenny Rash, DO         Current Outpatient Medications   Medication Sig Dispense Refill    cephALEXin (KEFLEX) 500 MG capsule Take 1 capsule by mouth 4 times daily for 7 days 28 capsule 0    sulfamethoxazole-trimethoprim (BACTRIM DS) 800-160 MG per tablet Take 1 tablet by mouth 2 times daily for 7 days 14 tablet 0    clotrimazole (LOTRIMIN) 1 % cream Apply twice a day for 2-4 weeks         Past History     Past Medical History:  Past Medical History:   Diagnosis Date    Asthma     Diabetes ([address])     Hypertension     Morbid obesity ([address])        Past Surgical History:  Past Surgical History:   Procedure Laterality Date    ORTHOPEDIC SURGERY      left leg    TONSILLECTOMY      TYMPANOSTOMY TUBE PLACEMENT      when child       Family History:  Family History   Problem Relation Age of Onset    Stroke Neg Hx     Heart Disease Neg Hx     Hypertension Other     No Known Problems Mother     No Known Problems Father     Cancer Neg Hx     Diabetes

## 2023-12-11 ENCOUNTER — HOSPITAL ENCOUNTER (EMERGENCY)
Facility: HOSPITAL | Age: 23
Discharge: HOME | End: 2023-12-11
Payer: MEDICAID

## 2023-12-11 VITALS
HEART RATE: 88 BPM | SYSTOLIC BLOOD PRESSURE: 150 MMHG | HEIGHT: 67 IN | RESPIRATION RATE: 16 BRPM | DIASTOLIC BLOOD PRESSURE: 98 MMHG | BODY MASS INDEX: 48.65 KG/M2 | TEMPERATURE: 98.6 F | OXYGEN SATURATION: 97 % | WEIGHT: 310 LBS

## 2023-12-11 DIAGNOSIS — L02.91 ABSCESS: Primary | ICD-10-CM

## 2023-12-11 PROCEDURE — 99283 EMERGENCY DEPT VISIT LOW MDM: CPT

## 2023-12-11 PROCEDURE — 10060 I&D ABSCESS SIMPLE/SINGLE: CPT

## 2023-12-11 RX ORDER — SULFAMETHOXAZOLE AND TRIMETHOPRIM 800; 160 MG/1; MG/1
1 TABLET ORAL 2 TIMES DAILY
Qty: 20 TABLET | Refills: 0 | Status: SHIPPED | OUTPATIENT
Start: 2023-12-11 | End: 2023-12-21

## 2023-12-11 RX ORDER — CEPHALEXIN 500 MG/1
500 CAPSULE ORAL 2 TIMES DAILY
Qty: 14 CAPSULE | Refills: 0 | Status: SHIPPED | OUTPATIENT
Start: 2023-12-11 | End: 2023-12-18

## 2023-12-11 ASSESSMENT — PAIN - FUNCTIONAL ASSESSMENT: PAIN_FUNCTIONAL_ASSESSMENT: 0-10

## 2023-12-11 ASSESSMENT — ENCOUNTER SYMPTOMS
VOMITING: 0
COLOR CHANGE: 1
DIARRHEA: 0
NAUSEA: 0
RHINORRHEA: 0
ABDOMINAL DISTENTION: 0
ABDOMINAL PAIN: 0

## 2023-12-11 ASSESSMENT — PAIN DESCRIPTION - LOCATION: LOCATION: BUTTOCKS

## 2023-12-11 ASSESSMENT — PAIN SCALES - GENERAL: PAINLEVEL_OUTOF10: 6

## 2023-12-11 NOTE — ED TRIAGE NOTES
Pt to triage c/o abscess to his left gluteal fold x 3d. Reports h/o of same in the past and had to have it drained.

## 2023-12-11 NOTE — ED NOTES
Pt discharged via ambulatory to Home. Pt Verbalized understanding and Demonstrated Understanding of discharge instructions. Vital signs stable.           Genny Espino RN  12/11/23 5501

## 2023-12-11 NOTE — ED PROVIDER NOTES
EMERGENCY DEPARTMENT HISTORY AND PHYSICAL EXAM      Patient Name: Rina Quijano  MRN: [id number]  YOB: 2000  Provider: Dax Roth PA-C  PCP: Greg Li MD   Time/Date of evaluation: 9:00 AM EST on 12/11/23    History of Presenting Illness     Chief Complaint   Patient presents with    Abscess       History Provided By: Patient     History Elizabeth Contreras):   Rina Quijano is a 21 y.o. male with a PMHX of asthma, diabetes, hypertension morbid obesity  who presents to the emergency department  by POV C/O of abscess formation in his left gluteal fold for the past 3 days. Patient states that he has a history of abscesses prior drainages in the past.    He denies any systemic symptoms such as fever, chills or drainage from site. Past History     Past Medical History:  Past Medical History:   Diagnosis Date    Asthma     Diabetes ([address])     Hypertension     Morbid obesity ([address])        Past Surgical History:  Past Surgical History:   Procedure Laterality Date    ORTHOPEDIC SURGERY      left leg    TONSILLECTOMY      TYMPANOSTOMY TUBE PLACEMENT      when child       Family History:  Family History   Problem Relation Age of Onset    Stroke Neg Hx     Heart Disease Neg Hx     Hypertension Other     No Known Problems Mother     No Known Problems Father     Cancer Neg Hx     Diabetes Neg Hx        Social History:  Social History     Tobacco Use    Smoking status: Never    Smokeless tobacco: Never   Substance Use Topics    Alcohol use: No    Drug use: No       Medications:  No current facility-administered medications for this encounter.      Current Outpatient Medications   Medication Sig Dispense Refill    sulfamethoxazole-trimethoprim (BACTRIM DS;SEPTRA DS) 800-160 MG per tablet Take 1 tablet by mouth 2 times daily for 10 days 20 tablet 0    cephALEXin (KEFLEX) 500 MG capsule Take 1 capsule by mouth 2 times daily for 7 days 14 capsule 0    clotrimazole (LOTRIMIN) 1 % cream Apply twice a day for 2-4